# Patient Record
Sex: MALE | Race: WHITE | NOT HISPANIC OR LATINO | Employment: FULL TIME | URBAN - NONMETROPOLITAN AREA
[De-identification: names, ages, dates, MRNs, and addresses within clinical notes are randomized per-mention and may not be internally consistent; named-entity substitution may affect disease eponyms.]

---

## 2023-11-24 ENCOUNTER — OFFICE VISIT (OUTPATIENT)
Dept: URGENT CARE | Facility: CLINIC | Age: 38
End: 2023-11-24
Payer: COMMERCIAL

## 2023-11-24 VITALS
HEART RATE: 75 BPM | HEIGHT: 68 IN | OXYGEN SATURATION: 98 % | WEIGHT: 237.66 LBS | TEMPERATURE: 98.8 F | RESPIRATION RATE: 18 BRPM | SYSTOLIC BLOOD PRESSURE: 129 MMHG | BODY MASS INDEX: 36.02 KG/M2 | DIASTOLIC BLOOD PRESSURE: 82 MMHG

## 2023-11-24 DIAGNOSIS — H66.90 ACUTE OTITIS MEDIA, UNSPECIFIED OTITIS MEDIA TYPE: Primary | ICD-10-CM

## 2023-11-24 DIAGNOSIS — J06.9 VIRAL URI WITH COUGH: ICD-10-CM

## 2023-11-24 PROCEDURE — 99213 OFFICE O/P EST LOW 20 MIN: CPT | Mod: 25 | Performed by: NURSE PRACTITIONER

## 2023-11-24 RX ORDER — BENZONATATE 100 MG/1
100-200 CAPSULE ORAL EVERY 8 HOURS PRN
Qty: 60 CAPSULE | Refills: 0 | Status: SHIPPED | OUTPATIENT
Start: 2023-11-24 | End: 2023-12-04

## 2023-11-24 RX ORDER — OMEPRAZOLE 20 MG/1
20 CAPSULE, DELAYED RELEASE ORAL 2 TIMES DAILY
COMMUNITY
Start: 2021-11-23

## 2023-11-24 RX ORDER — METOPROLOL SUCCINATE 25 MG/1
25 TABLET, EXTENDED RELEASE ORAL
COMMUNITY
Start: 2022-12-12

## 2023-11-24 RX ORDER — LISINOPRIL 20 MG/1
20 TABLET ORAL
COMMUNITY
Start: 2022-12-12

## 2023-11-24 RX ORDER — CETIRIZINE HYDROCHLORIDE 10 MG/1
10 TABLET ORAL
COMMUNITY
Start: 2016-03-30

## 2023-11-24 RX ORDER — VIT C/E/ZN/COPPR/LUTEIN/ZEAXAN 250MG-90MG
1000 CAPSULE ORAL
COMMUNITY
Start: 2017-07-28

## 2023-11-24 RX ORDER — CEFDINIR 300 MG/1
300 CAPSULE ORAL 2 TIMES DAILY
Qty: 20 CAPSULE | Refills: 0 | Status: SHIPPED | OUTPATIENT
Start: 2023-11-24 | End: 2023-12-04

## 2023-11-24 RX ORDER — FLUTICASONE PROPIONATE 50 MCG
1 SPRAY, SUSPENSION (ML) NASAL
COMMUNITY
Start: 2017-07-28

## 2023-11-24 NOTE — PROGRESS NOTES
[facility]  Emilee Cummings, APRN-CNP     Visit Note - 11/24/2023 3:18 PM   This note was generated with voice recognition software and may contain errors including spelling, grammar, syntax, and misrecognization of what was dictated.    Patient: Drake Tesfaye, MRN: [id number], 38 y.o., male   PCP: Dillon Ortega MD  ------------------------------------  ALLERGIES:   Allergies   Allergen Reactions    Citalopram Hydrobromide Other     chest pain    Clarithromycin Other        CURRENT MEDICATIONS:   Current Outpatient Medications   Medication Instructions    benzonatate (TESSALON) 100-200 mg, oral, Every 8 hours PRN, Do not crush or chew.    cefdinir (OMNICEF) 300 mg, oral, 2 times daily    cetirizine (ZYRTEC) 10 mg, oral, Daily RT    cholecalciferol (VITAMIN D-3) 1,000 Units, oral, Daily RT    fluticasone (Flonase) 50 mcg/actuation nasal spray 1 spray, Does not apply, Daily RT    lisinopril 20 mg, oral, Daily RT    metoprolol succinate XL (TOPROL-XL) 25 mg, oral, Daily RT    omeprazole (PRILOSEC) 20 mg, oral, 2 times daily     ------------------------------------  PAST MEDICAL HX:  History of allergies, GERD, SVT, HTN.   SURGICAL HX:  History of appendectomy.   FAMILY HX:   No pertinent history.   SOCIAL HX:   Is a former smoker.   ------------------------------------  CHIEF COMPLAINT:   Chief Complaint   Patient presents with    URI     Cough, sinus pressure/congestion, sore throat, body aches x 1 week. Recent travel on a cruise   Rt ear drainage this AM         HISTORY OF PRESENT ILLNESS: The history was obtained from patient. Drake is a 38 y.o. male, who presents with a chief complaint of a dry cough, sore throat, PND, and subjective fever/chills x 3 days. Reports he has also had mild nasal congestion (clear mucus), headaches, and pressure in both of his ears. He was treated for an ear infection  ~1.5 weeks ago (Doxycycline) - reports sxs improved but never fully resolved. He had some  "lightheadedness at onset of his symptoms, but this has resolved. He denies any new body aches, abdominal pain, chest pain, wheezing/shortness of breath, rashes, urinary symptoms, nausea/vomiting, and diarrhea. Denies any changes in mental status. No swelling in legs. Appetite is normal; is able to eat and drink fluids without difficulty; denies loss of sense of taste or smell. Reports symptoms have gotten worse since onset. Has been taking  Dayquil/Nyquil/Ibuprofen/Acetaminophen  without much relief; no other over-the-counter medications or home remedies for symptom management.  His wife and daughter are currently sick with similar sxs; no other known ill contacts but recently traveled on a cruise. Has not received the COVID vaccine. Last known COVID infection was in 2022 . Is a former smoker. . No known history of asthma/COPD/respiratory issues.     REVIEW OF SYSTEMS:  10 systems reviewed negative with exception of history of present illness as listed above.    TODAY'S VITALS: /82 (BP Location: Left arm, Patient Position: Sitting, BP Cuff Size: Large adult)   Pulse 75   Temp 37.1 °C (98.8 °F) (Oral)   Resp 18   Ht 1.727 m (5' 8\")   Wt 108 kg (237 lb 10.5 oz)   SpO2 98%   BMI 36.14 kg/m²     PHYSICAL EXAMINATION:  General:  Mildly ill-appearing, well nourished male; alert and oriented; in no acute distress. Sitting comfortably on exam table. Non-dyspneic. Accompanied by his wife and daughter.  Eyes:  Pupils equal, round and reactive to light. No conjunctival erythema; no scleral icterus.   HENT: No frontal or maxillary sinus tenderness; + audible nasal congestion. Airway patent, L TM with cloudy fluid but otherwise unremarkable; R TM moderately injected but not bulging. Ear canals clear/unremarkable bilaterally. Nasal mucosa mildly injected and edematous. Oral mucosa moist. Posterior pharynx mildly injected but without vesicles or oropharyngeal exudate aside from PND. Uvula is midline. Managing oral " secretions without difficulty.  Neck:  Supple. Mildly tender, mobile anterior cervical lymphadenopathy bilat. Trachea is midline.  Respiratory:  Respirations easy and unlabored, Breath sounds equal. Lungs are clear to auscultation; no wheezes, rhonchi, or rales; has good air movement throughout. + non-productive cough noted. Non-dyspneic with ambulation; able to maintain SpO2.  Cardiovascular:  Normal rate, Regular rhythm. Normal S1S2. No m/r/g. No peripheral edema.   Gastrointestinal:  Soft, non-tender, non-distended; no palpable masses or organomegaly. Bowel sounds normoactive.  Musculoskeletal:  Grossly normal; appropriate for age.     Integumentary:  Pink, warm, dry, and intact. No rashes or skin discoloration appreciated. Good skin turgor.  Neurologic:  Alert and oriented, no gross deficits.    Cognition and Speech:  Oriented, Speech clear and coherent.    Psychiatric:  Cooperative, Appropriate mood & affect.       ------------------------------------  Medical Decision Making  LABORATORY or RADIOLOGICAL IMAGING ORDERS/RESULTS:   None    IMPRESSION/PLAN:  Course: Worsening; stable    1. Acute otitis media, unspecified otitis media type  2. Viral URI with cough  - cefdinir (Omnicef) 300 mg capsule; Take 1 capsule (300 mg) by mouth 2 times a day for 10 days.  Dispense: 20 capsule; Refill: 0  - benzonatate (Tessalon) 100 mg capsule; Take 1-2 capsules (100-200 mg) by mouth every 8 hours if needed for cough for up to 10 days. Do not crush or chew.  Dispense: 60 capsule; Refill: 0    Symptoms/exam consistent with AOM refractory to course of Doxycycline (+viral URI with associated symptoms). Reports he has not tolerated Augmentin in the past, so will attempt treatment with Cefdinir today. Encouraged to prevent any water/additional liquids from entering the ear canal as able, to rest, to push fluids, and can use PRN Tylenol/ibuprofen for management of discomfort. Reviewed red flags to monitor for, expectations for  resolution of infection and improvement in symptoms, and encouraged to RTC or see pediatrician if symptoms not improving over the 2-3 days, or to seek care sooner if they worsen or if any red flags develop. Patient seems satisfied and agrees with plan of care; questions were encouraged and answered.      Discussed testing for COVID/influenza - patient declined, but urged precautionary measures and to consider home testing. He also declines strep testing today.     COMFORT Del Castillo-CNP   Advanced Practice Provider  [facility] URGENT CARE

## 2025-01-29 ENCOUNTER — TELEPHONE (OUTPATIENT)
Dept: GASTROENTEROLOGY | Facility: CLINIC | Age: 40
End: 2025-01-29
Payer: COMMERCIAL